# Patient Record
Sex: FEMALE | URBAN - METROPOLITAN AREA
[De-identification: names, ages, dates, MRNs, and addresses within clinical notes are randomized per-mention and may not be internally consistent; named-entity substitution may affect disease eponyms.]

---

## 2019-12-20 ENCOUNTER — OFFICE VISIT (OUTPATIENT)
Dept: OBSTETRICS AND GYNECOLOGY | Facility: CLINIC | Age: 39
End: 2019-12-20
Payer: COMMERCIAL

## 2019-12-20 VITALS — DIASTOLIC BLOOD PRESSURE: 78 MMHG | SYSTOLIC BLOOD PRESSURE: 120 MMHG | WEIGHT: 144.63 LBS

## 2019-12-20 DIAGNOSIS — D50.0 IRON DEFICIENCY ANEMIA DUE TO CHRONIC BLOOD LOSS: ICD-10-CM

## 2019-12-20 DIAGNOSIS — N92.0 MENORRHAGIA WITH REGULAR CYCLE: Primary | ICD-10-CM

## 2019-12-20 PROCEDURE — 99204 PR OFFICE/OUTPT VISIT, NEW, LEVL IV, 45-59 MIN: ICD-10-PCS | Mod: S$GLB,,, | Performed by: OBSTETRICS & GYNECOLOGY

## 2019-12-20 PROCEDURE — 99999 PR PBB SHADOW E&M-NEW PATIENT-LVL II: ICD-10-PCS | Mod: PBBFAC,,, | Performed by: OBSTETRICS & GYNECOLOGY

## 2019-12-20 PROCEDURE — 99204 OFFICE O/P NEW MOD 45 MIN: CPT | Mod: S$GLB,,, | Performed by: OBSTETRICS & GYNECOLOGY

## 2019-12-20 PROCEDURE — 99999 PR PBB SHADOW E&M-NEW PATIENT-LVL II: CPT | Mod: PBBFAC,,, | Performed by: OBSTETRICS & GYNECOLOGY

## 2019-12-20 RX ORDER — NAPROXEN SODIUM 220 MG/1
81 TABLET, FILM COATED ORAL DAILY
COMMUNITY

## 2019-12-20 RX ORDER — TRANEXAMIC ACID 650 MG/1
2 TABLET ORAL 3 TIMES DAILY
Status: ON HOLD | COMMUNITY
Start: 2019-12-16 | End: 2020-01-17

## 2019-12-20 RX ORDER — VITAMIN B COMPLEX
1 CAPSULE ORAL DAILY
COMMUNITY

## 2019-12-20 NOTE — PROGRESS NOTES
Subjective:       Patient ID: Sally Arroyo is a 39 y.o. female.    Chief Complaint:  Follow-up (anemia, menorrhagia )      History of Present Illness  HPI  Referral from Dr Mcguire.   Heavy menses with significant anemia requiring Fe infusion. .  Now with hct of 27   ON Lystedea with last cycle with improvement   Interested in definitive treatment   Reviewed records:  Embx and pap are benign.  Ultrasound with normal ovaries, possible endometrial polyp in otherwise normal uterus     Discussed clinical presentation and offered surgical options.  Recommend hysterectomy     Health Maintenance   Topic Date Due    Lipid Panel  1979    TETANUS VACCINE  1997    Pap Smear with HPV Cotest  2000    Mammogram  2019     GYN & OB History  Patient's last menstrual period was 2019.   Date of Last Pap: No result found    OB History    Para Term  AB Living   4 4           SAB TAB Ectopic Multiple Live Births                  # Outcome Date GA Lbr Marcos/2nd Weight Sex Delivery Anes PTL Lv   4 Para     F CS-Unspec      3 Para     M CS-Unspec      2 Para     F CS-Unspec      1 Para     M CS-Unspec          Review of Systems  Review of Systems        Objective:   Physical Exam     Assessment:        1. Menorrhagia with regular cycle    2. Iron deficiency anemia due to chronic blood loss                Plan:            Sally was seen today for follow-up.    Diagnoses and all orders for this visit:    Menorrhagia with regular cycle  Comments:  Robotic Hysterectomy salpingectomy     Iron deficiency anemia due to chronic blood loss  Comments:  continue with Iron Infusion as planned, Lystedea to control heavy menses until hysterectomy

## 2019-12-23 ENCOUNTER — TELEPHONE (OUTPATIENT)
Dept: OBSTETRICS AND GYNECOLOGY | Facility: CLINIC | Age: 39
End: 2019-12-23

## 2019-12-23 DIAGNOSIS — N92.0 MENORRHAGIA WITH REGULAR CYCLE: Primary | ICD-10-CM

## 2019-12-23 DIAGNOSIS — D50.0 IRON DEFICIENCY ANEMIA DUE TO CHRONIC BLOOD LOSS: ICD-10-CM

## 2020-01-13 ENCOUNTER — TELEPHONE (OUTPATIENT)
Dept: OBSTETRICS AND GYNECOLOGY | Facility: CLINIC | Age: 40
End: 2020-01-13

## 2020-01-13 NOTE — TELEPHONE ENCOUNTER
----- Message from Lindsay Ralph sent at 1/13/2020 10:00 AM CST -----  Contact: PATIENT  Type:  Patient Returning Call    Who Called:PATIENT  Who Left Message for Patient:ADOLFO  Does the patient know what this is regarding?: PRE OP  Would the patient rather a call back or a response via [facility]? CALL  Best Call Back Number:[phone]  Additional Information: PLEASE RETURN CALL ASAP

## 2020-01-15 ENCOUNTER — OFFICE VISIT (OUTPATIENT)
Dept: OBSTETRICS AND GYNECOLOGY | Facility: CLINIC | Age: 40
End: 2020-01-15
Payer: COMMERCIAL

## 2020-01-15 ENCOUNTER — HOSPITAL ENCOUNTER (OUTPATIENT)
Dept: PREADMISSION TESTING | Facility: HOSPITAL | Age: 40
Discharge: HOME | End: 2020-01-15
Attending: OBSTETRICS & GYNECOLOGY
Payer: COMMERCIAL

## 2020-01-15 VITALS — SYSTOLIC BLOOD PRESSURE: 98 MMHG | WEIGHT: 149.25 LBS | DIASTOLIC BLOOD PRESSURE: 78 MMHG

## 2020-01-15 VITALS — HEIGHT: 62 IN | BODY MASS INDEX: 27.05 KG/M2 | WEIGHT: 147 LBS

## 2020-01-15 DIAGNOSIS — N92.0 MENORRHAGIA WITH REGULAR CYCLE: Primary | ICD-10-CM

## 2020-01-15 PROCEDURE — 99499 NO LOS: ICD-10-PCS | Mod: S$GLB,,, | Performed by: OBSTETRICS & GYNECOLOGY

## 2020-01-15 PROCEDURE — 99999 PR PBB SHADOW E&M-EST. PATIENT-LVL II: ICD-10-PCS | Mod: PBBFAC,,,

## 2020-01-15 PROCEDURE — 99499 UNLISTED E&M SERVICE: CPT | Mod: S$GLB,,, | Performed by: OBSTETRICS & GYNECOLOGY

## 2020-01-15 PROCEDURE — 99999 PR PBB SHADOW E&M-EST. PATIENT-LVL II: CPT | Mod: PBBFAC,,,

## 2020-01-15 RX ORDER — ACETAMINOPHEN 500 MG
1000 TABLET ORAL
Status: CANCELLED | OUTPATIENT
Start: 2020-01-15 | End: 2020-01-15

## 2020-01-15 RX ORDER — MUPIROCIN 20 MG/G
OINTMENT TOPICAL
Status: CANCELLED | OUTPATIENT
Start: 2020-01-15

## 2020-01-15 RX ORDER — SODIUM CHLORIDE 9 MG/ML
INJECTION, SOLUTION INTRAVENOUS CONTINUOUS
Status: CANCELLED | OUTPATIENT
Start: 2020-01-15

## 2020-01-15 NOTE — DISCHARGE INSTRUCTIONS
To confirm, Your doctor has instructed you that surgery is scheduled for 01/17/20 at  0700am.       Please report to [facility], [address], 1st floor, main lobby by 0530am.  Pre admit office will call afternoon prior to surgery with final arrival time    INSTRUCTIONS IMPORTANT!!!   Do not eat or smoke after 12 midnight, but may have water/apple juice 3 hours prior to surgery. OK to brush teeth, no gum, candy or mints!    ¨ Take only these medicines with a small swallow of water-morning of surgery.  n/a    Pre operative instructions:  Please review the Pre-Operative Instruction booklet that you were given.        Bathing Instructions--See page 6 in the Pre-operative booklet.      Prevention of surgical site infections:     -Keep incisions clean and dry.   -Do not soak/submerge incisions in water until completely healed.   -Do not apply lotions, powders, creams, or deodorants to site.   -Always make sure hands are cleaned with antibacterial soap/ alcohol-based                 prior to touching the surgical site.  (This includes doctors,                 nurses, staff, and yourself.)    Signs and symptoms:   -Redness and pain around the area where you had surgery   -Drainage of cloudy fluid from your surgical wound   -Fever over 100.4       I have read or had read and explained to me, and understand the above information.  Additional comments or instructions:  Received a copy of Pre-operative instructions booklet, FAQ surgical site infection sheet, and packets of hibiclens (if indicated).

## 2020-01-15 NOTE — H&P (VIEW-ONLY)
History & Physical    SUBJECTIVE:     History of Present Illness:  Patient is a 39 y.o. female who presents for pre-op visit.  She is scheduled for RALH/bilateral salpingectomy on 20 with Dr. Travis Jason.  Patient was sent as referral from Dr. Clay Mcguire because of her heavy menstrual periods and severe anemia. Workup included benign EMB and pap smear.  Patient desires ovarian conservation.        Past Medical History:   Diagnosis Date    Anemia      Past Surgical History:   Procedure Laterality Date     SECTION      x 4      Social History     Socioeconomic History    Marital status: Unknown     Spouse name: Not on file    Number of children: Not on file    Years of education: Not on file    Highest education level: Not on file   Occupational History    Not on file   Social Needs    Financial resource strain: Not on file    Food insecurity:     Worry: Not on file     Inability: Not on file    Transportation needs:     Medical: Not on file     Non-medical: Not on file   Tobacco Use    Smoking status: Never Smoker   Substance and Sexual Activity    Alcohol use: Not Currently    Drug use: Never    Sexual activity: Yes     Partners: Male   Lifestyle    Physical activity:     Days per week: Not on file     Minutes per session: Not on file    Stress: Not on file   Relationships    Social connections:     Talks on phone: Not on file     Gets together: Not on file     Attends Tenriism service: Not on file     Active member of club or organization: Not on file     Attends meetings of clubs or organizations: Not on file     Relationship status: Not on file   Other Topics Concern    Not on file   Social History Narrative    Not on file     Family History   Problem Relation Age of Onset    Diabetes Maternal Grandmother     Hypertension Maternal Grandmother     Stroke Maternal Grandmother     Hypertension Father     Hypertension Mother     Cancer Brother     Diabetes Sister      Breast cancer Paternal Aunt     Cancer Paternal Aunt      Review of patient's allergies indicates:  No Known Allergies  Current Outpatient Medications   Medication Sig Dispense Refill    aspirin 81 MG Chew Take 81 mg by mouth once daily.      b complex vitamins capsule Take 1 capsule by mouth once daily.      ergocalciferol, vitamin D2, (VITAMIN D ORAL) Take 1 Dose by mouth.      tranexamic acid (LYSTEDA) 650 mg tablet Take 2 tablets by mouth 3 (three) times daily.       No current facility-administered medications for this visit.             Review of Systems:  Constitutional: no fever or chills  Respiratory: no cough or shortness of breath  Cardiovascular: no chest pain or palpitations  Gastrointestinal: no nausea or vomiting, tolerating diet  Genitourinary: see HPI  Hematologic/Lymphatic: no easy bruising or lymphadenopathy  Neurological: no seizures or tremors      OBJECTIVE:     Vitals:    01/15/20 1017   BP: 98/78         Physical Exam:  General: well developed, well nourished, no distress  Head: normocephalic  Neck: supple, symmetrical, trachea midline  Lungs:  clear to auscultation bilaterally and normal respiratory effort  Chest Wall: no tenderness  Heart: regular rate and rhythm, S1, S2 normal, no murmur, rub or gallop  Abdomen: soft, non-tender non-distented; bowel sounds normal; no masses,  no organomegaly  Extremities: no cyanosis or edema, or clubbing  Pulses: 2+ and symmetric      Laboratory  Last Hct at outside lab was 27  Preop labs awaiting results:     Diagnostic Results:  EMB benign  Pap smear negative  Imaging- normal appearing ovaries; possible endometrial polyp noted in cavity    ASSESSMENT/PLAN:     The risks, alternatives, and potential complications of robotic assisted hysterectomy w/ bilateral salpingectomy have been explained to the patient and surgical consent was thoroughly reviewed then signed by patient, witnessed by myself, and signed by Dr. GEM Jason.  I also spent time  reviewing the procedure and details about surgery with all concerns addressed.  She and her  had several questions, which were each addressed and answered to the patient's understanding and content. Proceed with RALH/BS Friday 1/17/20 at 7:00.  Preop instructions given, with instructions to shower with antibacterial soap instead of hibiclens.

## 2020-01-15 NOTE — PROGRESS NOTES
History & Physical    SUBJECTIVE:     History of Present Illness:  Patient is a 39 y.o. female who presents for pre-op visit.  She is scheduled for RALH/bilateral salpingectomy on 20 with Dr. Travis Jason.  Patient was sent as referral from Dr. Clay Mcguire because of her heavy menstrual periods and severe anemia. Workup included benign EMB and pap smear.  Patient desires ovarian conservation.        Past Medical History:   Diagnosis Date    Anemia      Past Surgical History:   Procedure Laterality Date     SECTION      x 4      Social History     Socioeconomic History    Marital status: Unknown     Spouse name: Not on file    Number of children: Not on file    Years of education: Not on file    Highest education level: Not on file   Occupational History    Not on file   Social Needs    Financial resource strain: Not on file    Food insecurity:     Worry: Not on file     Inability: Not on file    Transportation needs:     Medical: Not on file     Non-medical: Not on file   Tobacco Use    Smoking status: Never Smoker   Substance and Sexual Activity    Alcohol use: Not Currently    Drug use: Never    Sexual activity: Yes     Partners: Male   Lifestyle    Physical activity:     Days per week: Not on file     Minutes per session: Not on file    Stress: Not on file   Relationships    Social connections:     Talks on phone: Not on file     Gets together: Not on file     Attends Buddhist service: Not on file     Active member of club or organization: Not on file     Attends meetings of clubs or organizations: Not on file     Relationship status: Not on file   Other Topics Concern    Not on file   Social History Narrative    Not on file     Family History   Problem Relation Age of Onset    Diabetes Maternal Grandmother     Hypertension Maternal Grandmother     Stroke Maternal Grandmother     Hypertension Father     Hypertension Mother     Cancer Brother     Diabetes Sister      Breast cancer Paternal Aunt     Cancer Paternal Aunt      Review of patient's allergies indicates:  No Known Allergies  Current Outpatient Medications   Medication Sig Dispense Refill    aspirin 81 MG Chew Take 81 mg by mouth once daily.      b complex vitamins capsule Take 1 capsule by mouth once daily.      ergocalciferol, vitamin D2, (VITAMIN D ORAL) Take 1 Dose by mouth.      tranexamic acid (LYSTEDA) 650 mg tablet Take 2 tablets by mouth 3 (three) times daily.       No current facility-administered medications for this visit.             Review of Systems:  Constitutional: no fever or chills  Respiratory: no cough or shortness of breath  Cardiovascular: no chest pain or palpitations  Gastrointestinal: no nausea or vomiting, tolerating diet  Genitourinary: see HPI  Hematologic/Lymphatic: no easy bruising or lymphadenopathy  Neurological: no seizures or tremors      OBJECTIVE:     Vitals:    01/15/20 1017   BP: 98/78         Physical Exam:  General: well developed, well nourished, no distress  Head: normocephalic  Neck: supple, symmetrical, trachea midline  Lungs:  clear to auscultation bilaterally and normal respiratory effort  Chest Wall: no tenderness  Heart: regular rate and rhythm, S1, S2 normal, no murmur, rub or gallop  Abdomen: soft, non-tender non-distented; bowel sounds normal; no masses,  no organomegaly  Extremities: no cyanosis or edema, or clubbing  Pulses: 2+ and symmetric      Laboratory  Last Hct at outside lab was 27  Preop labs awaiting results:     Diagnostic Results:  EMB benign  Pap smear negative  Imaging- normal appearing ovaries; possible endometrial polyp noted in cavity    ASSESSMENT/PLAN:     The risks, alternatives, and potential complications of robotic assisted hysterectomy w/ bilateral salpingectomy have been explained to the patient and surgical consent was thoroughly reviewed then signed by patient, witnessed by myself, and signed by Dr. GEM Jason.  I also spent time  reviewing the procedure and details about surgery with all concerns addressed.  She and her  had several questions, which were each addressed and answered to the patient's understanding and content. Proceed with RALH/BS Friday 1/17/20 at 7:00.  Preop instructions given, with instructions to shower with antibacterial soap instead of hibiclens.

## 2020-01-16 ENCOUNTER — ANESTHESIA EVENT (OUTPATIENT)
Dept: SURGERY | Facility: HOSPITAL | Age: 40
End: 2020-01-16
Payer: COMMERCIAL

## 2020-01-17 ENCOUNTER — HOSPITAL ENCOUNTER (OUTPATIENT)
Facility: HOSPITAL | Age: 40
Discharge: HOME | End: 2020-01-17
Attending: OBSTETRICS & GYNECOLOGY | Admitting: OBSTETRICS & GYNECOLOGY
Payer: COMMERCIAL

## 2020-01-17 ENCOUNTER — ANESTHESIA (OUTPATIENT)
Dept: SURGERY | Facility: HOSPITAL | Age: 40
End: 2020-01-17
Payer: COMMERCIAL

## 2020-01-17 VITALS
SYSTOLIC BLOOD PRESSURE: 111 MMHG | HEIGHT: 62 IN | DIASTOLIC BLOOD PRESSURE: 66 MMHG | HEART RATE: 109 BPM | WEIGHT: 147 LBS | RESPIRATION RATE: 20 BRPM | BODY MASS INDEX: 27.05 KG/M2 | TEMPERATURE: 97 F | OXYGEN SATURATION: 99 %

## 2020-01-17 DIAGNOSIS — Z90.710 STATUS POST LAPAROSCOPIC HYSTERECTOMY: Primary | ICD-10-CM

## 2020-01-17 DIAGNOSIS — I49.8 SINUS ARRHYTHMIA: ICD-10-CM

## 2020-01-17 DIAGNOSIS — Z90.710 STATUS POST HYSTERECTOMY: ICD-10-CM

## 2020-01-17 DIAGNOSIS — D50.0 IRON DEFICIENCY ANEMIA DUE TO CHRONIC BLOOD LOSS: ICD-10-CM

## 2020-01-17 DIAGNOSIS — N92.0 MENORRHAGIA WITH REGULAR CYCLE: ICD-10-CM

## 2020-01-17 LAB
B-HCG UR QL: NEGATIVE
CTP QC/QA: YES

## 2020-01-17 PROCEDURE — 81025 URINE PREGNANCY TEST: CPT | Performed by: OBSTETRICS & GYNECOLOGY

## 2020-01-17 PROCEDURE — 63600175 PHARM REV CODE 636 W HCPCS: Performed by: ANESTHESIOLOGY

## 2020-01-17 PROCEDURE — 27201423 OPTIME MED/SURG SUP & DEVICES STERILE SUPPLY: Performed by: OBSTETRICS & GYNECOLOGY

## 2020-01-17 PROCEDURE — 58571 TLH W/T/O 250 G OR LESS: CPT | Mod: ,,, | Performed by: OBSTETRICS & GYNECOLOGY

## 2020-01-17 PROCEDURE — 58571 PR LAPAROSCOPY W TOT HYSTERECTUTERUS <=250 GRAM  W TUBE/OVARY: ICD-10-PCS | Mod: ,,, | Performed by: OBSTETRICS & GYNECOLOGY

## 2020-01-17 PROCEDURE — 25000003 PHARM REV CODE 250: Performed by: OBSTETRICS & GYNECOLOGY

## 2020-01-17 PROCEDURE — 99900035 HC TECH TIME PER 15 MIN (STAT)

## 2020-01-17 PROCEDURE — 37000008 HC ANESTHESIA 1ST 15 MINUTES: Performed by: OBSTETRICS & GYNECOLOGY

## 2020-01-17 PROCEDURE — 71000039 HC RECOVERY, EACH ADD'L HOUR: Performed by: OBSTETRICS & GYNECOLOGY

## 2020-01-17 PROCEDURE — 88307 TISSUE EXAM BY PATHOLOGIST: CPT | Performed by: PATHOLOGY

## 2020-01-17 PROCEDURE — 88307 TISSUE EXAM BY PATHOLOGIST: CPT | Mod: 26,,, | Performed by: PATHOLOGY

## 2020-01-17 PROCEDURE — 88307 PR  SURG PATH,LEVEL V: ICD-10-PCS | Mod: 26,,, | Performed by: PATHOLOGY

## 2020-01-17 PROCEDURE — C2628 CATHETER, OCCLUSION: HCPCS | Performed by: OBSTETRICS & GYNECOLOGY

## 2020-01-17 PROCEDURE — 25000003 PHARM REV CODE 250: Performed by: PHYSICIAN ASSISTANT

## 2020-01-17 PROCEDURE — 36000712 HC OR TIME LEV V 1ST 15 MIN: Performed by: OBSTETRICS & GYNECOLOGY

## 2020-01-17 PROCEDURE — 63600175 PHARM REV CODE 636 W HCPCS: Performed by: NURSE ANESTHETIST, CERTIFIED REGISTERED

## 2020-01-17 PROCEDURE — 93005 ELECTROCARDIOGRAM TRACING: CPT

## 2020-01-17 PROCEDURE — 37000009 HC ANESTHESIA EA ADD 15 MINS: Performed by: OBSTETRICS & GYNECOLOGY

## 2020-01-17 PROCEDURE — 93010 EKG 12-LEAD: ICD-10-PCS | Mod: ,,, | Performed by: INTERNAL MEDICINE

## 2020-01-17 PROCEDURE — 25000003 PHARM REV CODE 250: Performed by: NURSE ANESTHETIST, CERTIFIED REGISTERED

## 2020-01-17 PROCEDURE — 36000713 HC OR TIME LEV V EA ADD 15 MIN: Performed by: OBSTETRICS & GYNECOLOGY

## 2020-01-17 PROCEDURE — 25000003 PHARM REV CODE 250: Performed by: ANESTHESIOLOGY

## 2020-01-17 PROCEDURE — 71000033 HC RECOVERY, INTIAL HOUR: Performed by: OBSTETRICS & GYNECOLOGY

## 2020-01-17 PROCEDURE — 93010 ELECTROCARDIOGRAM REPORT: CPT | Mod: ,,, | Performed by: INTERNAL MEDICINE

## 2020-01-17 PROCEDURE — 63600175 PHARM REV CODE 636 W HCPCS: Performed by: PHYSICIAN ASSISTANT

## 2020-01-17 PROCEDURE — 63600175 PHARM REV CODE 636 W HCPCS: Performed by: OBSTETRICS & GYNECOLOGY

## 2020-01-17 PROCEDURE — S0028 INJECTION, FAMOTIDINE, 20 MG: HCPCS | Performed by: ANESTHESIOLOGY

## 2020-01-17 PROCEDURE — S5010 5% DEXTROSE AND 0.45% SALINE: HCPCS | Performed by: OBSTETRICS & GYNECOLOGY

## 2020-01-17 RX ORDER — HYDROMORPHONE HYDROCHLORIDE 2 MG/ML
0.2 INJECTION, SOLUTION INTRAMUSCULAR; INTRAVENOUS; SUBCUTANEOUS EVERY 5 MIN PRN
Status: DISCONTINUED | OUTPATIENT
Start: 2020-01-17 | End: 2020-01-17 | Stop reason: HOSPADM

## 2020-01-17 RX ORDER — GLYCOPYRROLATE 0.2 MG/ML
INJECTION INTRAMUSCULAR; INTRAVENOUS
Status: DISCONTINUED | OUTPATIENT
Start: 2020-01-17 | End: 2020-01-17

## 2020-01-17 RX ORDER — ACETAMINOPHEN 500 MG
1000 TABLET ORAL
Status: COMPLETED | OUTPATIENT
Start: 2020-01-17 | End: 2020-01-17

## 2020-01-17 RX ORDER — HYDROMORPHONE HYDROCHLORIDE 1 MG/ML
1 INJECTION, SOLUTION INTRAMUSCULAR; INTRAVENOUS; SUBCUTANEOUS EVERY 4 HOURS PRN
Status: DISCONTINUED | OUTPATIENT
Start: 2020-01-17 | End: 2020-01-17 | Stop reason: HOSPADM

## 2020-01-17 RX ORDER — CEFAZOLIN SODIUM 2 G/50ML
2 SOLUTION INTRAVENOUS
Status: COMPLETED | OUTPATIENT
Start: 2020-01-17 | End: 2020-01-17

## 2020-01-17 RX ORDER — DEXTROSE MONOHYDRATE AND SODIUM CHLORIDE 5; .45 G/100ML; G/100ML
INJECTION, SOLUTION INTRAVENOUS CONTINUOUS
Status: DISCONTINUED | OUTPATIENT
Start: 2020-01-17 | End: 2020-01-17 | Stop reason: HOSPADM

## 2020-01-17 RX ORDER — DIPHENHYDRAMINE HYDROCHLORIDE 50 MG/ML
25 INJECTION INTRAMUSCULAR; INTRAVENOUS EVERY 6 HOURS PRN
Status: DISCONTINUED | OUTPATIENT
Start: 2020-01-17 | End: 2020-01-17 | Stop reason: HOSPADM

## 2020-01-17 RX ORDER — PROPOFOL 10 MG/ML
VIAL (ML) INTRAVENOUS
Status: DISCONTINUED | OUTPATIENT
Start: 2020-01-17 | End: 2020-01-17

## 2020-01-17 RX ORDER — SODIUM CHLORIDE, SODIUM LACTATE, POTASSIUM CHLORIDE, CALCIUM CHLORIDE 600; 310; 30; 20 MG/100ML; MG/100ML; MG/100ML; MG/100ML
INJECTION, SOLUTION INTRAVENOUS CONTINUOUS PRN
Status: DISCONTINUED | OUTPATIENT
Start: 2020-01-17 | End: 2020-01-17

## 2020-01-17 RX ORDER — SODIUM CHLORIDE 0.9 % (FLUSH) 0.9 %
3 SYRINGE (ML) INJECTION EVERY 8 HOURS
Status: DISCONTINUED | OUTPATIENT
Start: 2020-01-17 | End: 2020-01-17 | Stop reason: HOSPADM

## 2020-01-17 RX ORDER — IBUPROFEN 400 MG/1
800 TABLET ORAL EVERY 8 HOURS
Status: DISCONTINUED | OUTPATIENT
Start: 2020-01-18 | End: 2020-01-17 | Stop reason: HOSPADM

## 2020-01-17 RX ORDER — LIDOCAINE HYDROCHLORIDE 10 MG/ML
INJECTION, SOLUTION EPIDURAL; INFILTRATION; INTRACAUDAL; PERINEURAL
Status: DISCONTINUED | OUTPATIENT
Start: 2020-01-17 | End: 2020-01-17

## 2020-01-17 RX ORDER — MEPERIDINE HYDROCHLORIDE 50 MG/ML
12.5 INJECTION INTRAMUSCULAR; INTRAVENOUS; SUBCUTANEOUS ONCE AS NEEDED
Status: DISCONTINUED | OUTPATIENT
Start: 2020-01-17 | End: 2020-01-17 | Stop reason: HOSPADM

## 2020-01-17 RX ORDER — FENTANYL CITRATE 50 UG/ML
INJECTION, SOLUTION INTRAMUSCULAR; INTRAVENOUS
Status: DISCONTINUED | OUTPATIENT
Start: 2020-01-17 | End: 2020-01-17

## 2020-01-17 RX ORDER — SUCCINYLCHOLINE CHLORIDE 20 MG/ML
INJECTION INTRAMUSCULAR; INTRAVENOUS
Status: DISCONTINUED | OUTPATIENT
Start: 2020-01-17 | End: 2020-01-17

## 2020-01-17 RX ORDER — ONDANSETRON 2 MG/ML
INJECTION INTRAMUSCULAR; INTRAVENOUS
Status: DISCONTINUED | OUTPATIENT
Start: 2020-01-17 | End: 2020-01-17

## 2020-01-17 RX ORDER — METOCLOPRAMIDE HYDROCHLORIDE 5 MG/ML
10 INJECTION INTRAMUSCULAR; INTRAVENOUS EVERY 6 HOURS
Status: DISCONTINUED | OUTPATIENT
Start: 2020-01-17 | End: 2020-01-17

## 2020-01-17 RX ORDER — HYDROCODONE BITARTRATE AND ACETAMINOPHEN 5; 325 MG/1; MG/1
1 TABLET ORAL EVERY 6 HOURS PRN
Qty: 15 TABLET | Refills: 0 | Status: SHIPPED | OUTPATIENT
Start: 2020-01-17 | End: 2020-01-24

## 2020-01-17 RX ORDER — DIPHENHYDRAMINE HCL 25 MG
25 CAPSULE ORAL EVERY 4 HOURS PRN
Status: DISCONTINUED | OUTPATIENT
Start: 2020-01-17 | End: 2020-01-17 | Stop reason: HOSPADM

## 2020-01-17 RX ORDER — METOCLOPRAMIDE HYDROCHLORIDE 5 MG/ML
10 INJECTION INTRAMUSCULAR; INTRAVENOUS EVERY 10 MIN PRN
Status: DISCONTINUED | OUTPATIENT
Start: 2020-01-17 | End: 2020-01-17 | Stop reason: HOSPADM

## 2020-01-17 RX ORDER — KETOROLAC TROMETHAMINE 30 MG/ML
30 INJECTION, SOLUTION INTRAMUSCULAR; INTRAVENOUS EVERY 6 HOURS
Status: DISCONTINUED | OUTPATIENT
Start: 2020-01-17 | End: 2020-01-17 | Stop reason: HOSPADM

## 2020-01-17 RX ORDER — ROCURONIUM BROMIDE 10 MG/ML
INJECTION, SOLUTION INTRAVENOUS
Status: DISCONTINUED | OUTPATIENT
Start: 2020-01-17 | End: 2020-01-17

## 2020-01-17 RX ORDER — HYDROCODONE BITARTRATE AND ACETAMINOPHEN 5; 325 MG/1; MG/1
1 TABLET ORAL EVERY 4 HOURS PRN
Status: DISCONTINUED | OUTPATIENT
Start: 2020-01-17 | End: 2020-01-17 | Stop reason: HOSPADM

## 2020-01-17 RX ORDER — SODIUM CHLORIDE 9 MG/ML
INJECTION, SOLUTION INTRAVENOUS CONTINUOUS
Status: DISCONTINUED | OUTPATIENT
Start: 2020-01-17 | End: 2020-01-17

## 2020-01-17 RX ORDER — DEXAMETHASONE SODIUM PHOSPHATE 4 MG/ML
INJECTION, SOLUTION INTRA-ARTICULAR; INTRALESIONAL; INTRAMUSCULAR; INTRAVENOUS; SOFT TISSUE
Status: DISCONTINUED | OUTPATIENT
Start: 2020-01-17 | End: 2020-01-17

## 2020-01-17 RX ORDER — NEOSTIGMINE METHYLSULFATE 1 MG/ML
INJECTION, SOLUTION INTRAVENOUS
Status: DISCONTINUED | OUTPATIENT
Start: 2020-01-17 | End: 2020-01-17

## 2020-01-17 RX ORDER — ONDANSETRON 8 MG/1
8 TABLET, ORALLY DISINTEGRATING ORAL EVERY 8 HOURS PRN
Status: DISCONTINUED | OUTPATIENT
Start: 2020-01-17 | End: 2020-01-17 | Stop reason: HOSPADM

## 2020-01-17 RX ORDER — FAMOTIDINE 20 MG/50ML
20 INJECTION, SOLUTION INTRAVENOUS 2 TIMES DAILY
Status: DISCONTINUED | OUTPATIENT
Start: 2020-01-17 | End: 2020-01-17

## 2020-01-17 RX ORDER — ONDANSETRON 4 MG/1
4 TABLET, ORALLY DISINTEGRATING ORAL EVERY 8 HOURS PRN
Qty: 14 TABLET | Refills: 0 | Status: SHIPPED | OUTPATIENT
Start: 2020-01-17 | End: 2020-02-26

## 2020-01-17 RX ORDER — MUPIROCIN 20 MG/G
OINTMENT TOPICAL
Status: DISCONTINUED | OUTPATIENT
Start: 2020-01-17 | End: 2020-01-17 | Stop reason: HOSPADM

## 2020-01-17 RX ORDER — PROMETHAZINE HYDROCHLORIDE 25 MG/1
25 TABLET ORAL EVERY 6 HOURS PRN
Status: DISCONTINUED | OUTPATIENT
Start: 2020-01-17 | End: 2020-01-17 | Stop reason: HOSPADM

## 2020-01-17 RX ORDER — MIDAZOLAM HYDROCHLORIDE 1 MG/ML
INJECTION, SOLUTION INTRAMUSCULAR; INTRAVENOUS
Status: DISCONTINUED | OUTPATIENT
Start: 2020-01-17 | End: 2020-01-17

## 2020-01-17 RX ADMIN — FAMOTIDINE 20 MG: 20 INJECTION, SOLUTION INTRAVENOUS at 07:01

## 2020-01-17 RX ADMIN — FENTANYL CITRATE 50 MCG: 50 INJECTION, SOLUTION INTRAMUSCULAR; INTRAVENOUS at 07:01

## 2020-01-17 RX ADMIN — KETOROLAC TROMETHAMINE 30 MG: 30 INJECTION, SOLUTION INTRAMUSCULAR; INTRAVENOUS at 11:01

## 2020-01-17 RX ADMIN — PROMETHAZINE HYDROCHLORIDE 25 MG: 25 TABLET ORAL at 11:01

## 2020-01-17 RX ADMIN — HYDROMORPHONE HYDROCHLORIDE 0.2 MG: 2 INJECTION INTRAMUSCULAR; INTRAVENOUS; SUBCUTANEOUS at 10:01

## 2020-01-17 RX ADMIN — SUCCINYLCHOLINE CHLORIDE 100 MG: 20 INJECTION, SOLUTION INTRAMUSCULAR; INTRAVENOUS at 07:01

## 2020-01-17 RX ADMIN — ACETAMINOPHEN 1000 MG: 500 TABLET ORAL at 04:01

## 2020-01-17 RX ADMIN — HYDROMORPHONE HYDROCHLORIDE 0.2 MG: 2 INJECTION INTRAMUSCULAR; INTRAVENOUS; SUBCUTANEOUS at 09:01

## 2020-01-17 RX ADMIN — ACETAMINOPHEN 1000 MG: 500 TABLET ORAL at 06:01

## 2020-01-17 RX ADMIN — ONDANSETRON 4 MG: 2 INJECTION, SOLUTION INTRAMUSCULAR; INTRAVENOUS at 07:01

## 2020-01-17 RX ADMIN — ROBINUL 0.6 MG: 0.2 INJECTION INTRAMUSCULAR; INTRAVENOUS at 08:01

## 2020-01-17 RX ADMIN — SODIUM CHLORIDE, SODIUM LACTATE, POTASSIUM CHLORIDE, AND CALCIUM CHLORIDE: 600; 310; 30; 20 INJECTION, SOLUTION INTRAVENOUS at 07:01

## 2020-01-17 RX ADMIN — PROPOFOL 160 MG: 10 INJECTION, EMULSION INTRAVENOUS at 07:01

## 2020-01-17 RX ADMIN — ROCURONIUM BROMIDE 35 MG: 10 INJECTION, SOLUTION INTRAVENOUS at 07:01

## 2020-01-17 RX ADMIN — NEOSTIGMINE METHYLSULFATE 4 MG: 1 INJECTION INTRAVENOUS at 08:01

## 2020-01-17 RX ADMIN — FENTANYL CITRATE 50 MCG: 50 INJECTION, SOLUTION INTRAMUSCULAR; INTRAVENOUS at 09:01

## 2020-01-17 RX ADMIN — MIDAZOLAM 2 MG: 1 INJECTION INTRAMUSCULAR; INTRAVENOUS at 07:01

## 2020-01-17 RX ADMIN — DEXAMETHASONE SODIUM PHOSPHATE 8 MG: 4 INJECTION, SOLUTION INTRA-ARTICULAR; INTRALESIONAL; INTRAMUSCULAR; INTRAVENOUS; SOFT TISSUE at 07:01

## 2020-01-17 RX ADMIN — ONDANSETRON 8 MG: 8 TABLET, ORALLY DISINTEGRATING ORAL at 02:01

## 2020-01-17 RX ADMIN — CEFAZOLIN SODIUM 2 G: 2 SOLUTION INTRAVENOUS at 07:01

## 2020-01-17 RX ADMIN — METOCLOPRAMIDE 10 MG: 5 INJECTION, SOLUTION INTRAMUSCULAR; INTRAVENOUS at 07:01

## 2020-01-17 RX ADMIN — Medication 20 MG: at 07:01

## 2020-01-17 RX ADMIN — LIDOCAINE HYDROCHLORIDE 50 MG: 10 INJECTION, SOLUTION EPIDURAL; INFILTRATION; INTRACAUDAL; PERINEURAL at 07:01

## 2020-01-17 RX ADMIN — ROCURONIUM BROMIDE 5 MG: 10 INJECTION, SOLUTION INTRAVENOUS at 07:01

## 2020-01-17 RX ADMIN — DEXTROSE AND SODIUM CHLORIDE: 5; .45 INJECTION, SOLUTION INTRAVENOUS at 11:01

## 2020-01-17 NOTE — NURSING
Preparing to discharge pt, attempting to void one more time, gave discharge paperwork for pt to review, no follow up appts to make, zofran and norco already with pt's mahad at bs

## 2020-01-17 NOTE — ASSESSMENT & PLAN NOTE
POD #0 s/p RALH bilateral salpingectomy  Pt doing well, afebrile overnight.  Proceed with routine postoperative care, encouraged ambulation, aware ok to shower.  Pt counseled on management plan and discharge goals. Pending successful void trial and continued resolution of nausea/vomiting will plan to discharge home. Will send zofran in to pharmacy. Patient in agreement.

## 2020-01-17 NOTE — OP NOTE
[facility]  Surgery Department  Operative Note    SUMMARY     Date of Procedure: 1/17/2020     Procedure: Procedure(s) (LRB):  XI ROBOTIC HYSTERECTOMY (N/A)     Surgeon(s) and Role:     * PRO Jason MD - Primary    Assisting Surgeon: Magali WATERMAN     Pre-Operative Diagnosis: Menorrhagia with regular cycle [N92.0]  Iron deficiency anemia due to chronic blood loss [D50.0]    Post-Operative Diagnosis: Post-Op Diagnosis Codes:     * Menorrhagia with regular cycle [N92.0]     * Iron deficiency anemia due to chronic blood loss [D50.0]    Anesthesia: General    Technical Procedures Used: [employer] Robotic System     Description of the Findings of the Procedure:     Procedure in Detail/Findings:  Operative findings    Upper abdomen normal liver , no adhesions, no bowel abnormalities    Pelvis:  Uterus normal size and shape,  Ovaries and tubes normal ,                  No Adhesions, No endometriosis                 Ureters in there normal retroperitoneal position through the pelvis                 Bladder Flap with dense adhesions across the anterior cervix   Installation of sterile mild after removal of the uterus shows no rent in the bladder         The patient was taken to the surgical suite.  General              intubation  anesthesia is induced.                                                                   The patient was placed in supine lithotomy  position  with low Oneil stirrups                                                                   The vagina was prepped with Betadine.  A Ott     catheter, BENJY manipulator with  KOH colpotomizer placed.    Abdomen prepped   with  chlorhexidine solution                                                                Sterile drapes applied     Time-out taken with all members of the operating team.       Veress  needle placed through the umbilicus, abdomen elevated with towel clamps.     CO2 gas insufflation to 15 mmHg.          Trochar  placement as follows:       8 mm bladeless trochars:      4 trochars, transverse line 2 cm above the umbilicus, 8 cm apart with direct visualization after the initial placement                     The [employer] robotic XI   system docked from the right side of the patient.     .    Instrument placement as follows   Camera:  Right midline port   Unipolar Scissors:  Right lateral port   [place] Bipolar Forceps:  Left midline port   Fenestrated Forceps:  Left lateral port    The surgeon moved to the robotic console and the first assistant remained at the bedside for uterine manipulation and utilization of instruments placed through the left lateral trochar                                                                              Prior to starting the hysterectomy, the anatomical landmarks of the pelvis and the pelvic course of the ureters right and left are identified.   The [place] bipolar is used for cautery and the scissors used for transection of the pedicles .  The  round ligaments are  Transected after cautery on each side and dissection of the broad ligament and development of the bladder flap is accomplished  with the unipolar scissors   The mesosalpinx of the right and left fallopian tubes are cauterized with the [place] forceps and incised with scissors to the level of the uterus     The anterior and posterior vagina entered over the colpotomizer with unipolar scissors and the colpotomy is extended over the colpotomizer anterior and posterior to the ascending uterine bilateral uterine arteries.    The Uterine vessels are then cauterized with the [place] instrument and transected.     With the uterus free of blood supply and vaginal connection, it is removed with the cervix and bilateral tubes through the vagina.    Bleeding of the uterine pedicle or vaginal cuff is cauterized.   Closure of the vaginal cuff done with Zero Vicryl suture interrupted figure of 8 starting at each angle        A 1 cm area of  thinning high on the bladder flap is oversewn with 2 zero Vicryl suture running        All needles used in the abdomen removed through the vagina or  the 8 mm trocar on the left are  accounted for.       Good hemostasis was  Appreciated in the operative field, any bleeding noted cauterized with bipolar instrument     Any excess fluid was suctioned free.    After the instruments were removed, the da Anisha robotic system was undocked        All trochar  sites closed with 4-0 Monocryl subcuticular and Dermabond.       The specimen removed from the vagina.  Vagina examined for any tears.       The  patient taken to Recovery in stable condition with lucas catheter in place              Significant Surgical Tasks Conducted by the Assistant(s), if Applicable: Bedside assistance     Complications: No    Estimated Blood Loss (EBL): 40 mL           Implants: * No implants in log *    Specimens:      Uterus, cervix, Bilateral fallopian tubes            Condition: Good    Disposition: PACU - hemodynamically stable.    Attestation: I was present and scrubbed for the entire procedure.

## 2020-01-17 NOTE — H&P
[facility]  Obstetrics & Gynecology  Pre op note     Patient Name: Sally Arroyo  MRN: [id number]  Admission Date: 1/17/2020  Primary Care Provider: Ryne Wyatt MD    Subjective:     Chief Complaint/Reason for Admission: Heavy menses     History of Present Illness:  Referral from Dr Mcguire.   Heavy menses with significant anemia requiring Fe infusion. .  Now with hct of 27   ON Lystedea with last cycle with improvement   Interested in definitive treatment   Reviewed records:  Embx and pap are benign.  Ultrasound with normal ovaries, possible endometrial polyp in otherwise normal uterus     No new subjective & objective note has been filed under this hospital service since the last note was generated.    Assessment/Plan:     * Menorrhagia with regular cycle  Robotic Hysterectomy bilateral salpingectomy     Iron deficiency anemia due to chronic blood loss  Hysterectomy         F Travis Jason MD  Obstetrics & Gynecology  [facility]

## 2020-01-17 NOTE — DISCHARGE SUMMARY
[facility] -   Obstetrics & Gynecology  Discharge Summary    Patient Name: Sally Arroyo  MRN: [id number]  Admission Date: 1/17/2020  Hospital Length of Stay: 0 days  Discharge Date and Time:  01/17/2020 3:26 PM  Attending Physician: PRO Jason MD   Discharging Provider: Blessing Wright PA-C  Primary Care Provider: Ryne Wyatt MD    HPI:  Referral from Dr Mcguire.   Heavy menses with significant anemia requiring Fe infusion. .  Now with hct of 27   ON Lystedea with last cycle with improvement   Interested in definitive treatment   Reviewed records:  Embx and pap are benign.  Ultrasound with normal ovaries, possible endometrial polyp in otherwise normal uterus     Hospital Course:  Patient presented for robotic assisted laparoscopic hysterectomy bilateral salpingectomy for menorrhagia. Patient progressed well postoperatively without complication, pain well controlled with po medications, and resolution of post op nausea and vomiting tolerating po liquids. Reviewed discharge instructions including restricting heavy lifting, pelvic rest. Stable for discharge post op day 0 pending successful void trial.       Procedure(s) (LRB):  XI ROBOTIC HYSTERECTOMY (N/A)         Significant Diagnostic Studies: Labs: All labs within the past 24 hours have been reviewed    Pending Diagnostic Studies:     Procedure Component Value Units Date/Time    Specimen to Pathology, Surgery Gynecology and Obstetrics [[id number]] Collected:  01/17/20 0842    Order Status:  Sent Lab Status:  In process Updated:  01/17/20 1219        Final Active Diagnoses:    Diagnosis Date Noted POA    PRINCIPAL PROBLEM:  Menorrhagia with regular cycle [N92.0] 12/20/2019 Yes    Status post hysterectomy [Z90.710] 01/17/2020 Unknown      Problems Resolved During this Admission:    Diagnosis Date Noted Date Resolved POA    Iron deficiency anemia due to chronic blood loss [D50.0] 12/20/2019 01/17/2020 Yes        Discharged Condition:  good    Disposition: Home or Self Care    Follow Up:  Follow-up Information     F Travis Jason MD. Go on 2/28/2020.    Specialties:  Obstetrics, Obstetrics and Gynecology  Why:  Post op  Contact information:  [address]  [place] [id number] [phone]                 Patient Instructions:      Diet Adult Regular     Lifting restrictions   Order Comments: Limit lifting to 15 lb     Other restrictions (specify):   Order Comments: Pelvic rest: nothing in the vagina, including douching, tampons, or intercourse for 12 weeks  Shower only, no baths until cleared by surgeon     No driving until:   Order Comments: No driving until off of all opiate pain meds and able to brake quickly without pain.     Notify your health care provider if you experience any of the following:  persistent nausea and vomiting or diarrhea     Notify your health care provider if you experience any of the following:  severe uncontrolled pain     Notify your health care provider if you experience any of the following:  redness, tenderness, or signs of infection (pain, swelling, redness, odor or green/yellow discharge around incision site)     Notify your health care provider if you experience any of the following:  difficulty breathing or increased cough     Notify your health care provider if you experience any of the following:  increased confusion or weakness     Notify your health care provider if you experience any of the following:   Order Comments: Heavy vaginal bleeding.     Medications:  Reconciled Home Medications:      Medication List      START taking these medications    HYDROcodone-acetaminophen 5-325 mg per tablet  Commonly known as:  NORCO  Take 1 tablet by mouth every 6 (six) hours as needed.     ondansetron 4 MG Tbdl  Commonly known as:  ZOFRAN-ODT  Take 1 tablet (4 mg total) by mouth every 8 (eight) hours as needed (nausea).        CONTINUE taking these medications    aspirin 81 MG Chew  Take 81 mg by mouth once  daily.     b complex vitamins capsule  Take 1 capsule by mouth once daily.     VITAMIN D ORAL  Take 1 Dose by mouth.        STOP taking these medications    tranexamic acid 650 mg tablet  Commonly known as:  JASSTEDA            Blessing Wright PA-C  Obstetrics & Gynecology  [facility]

## 2020-01-17 NOTE — SUBJECTIVE & OBJECTIVE
Interval History: Patient reports she is doing well. She is ambulating well without dizziness or shortness of breath but did develop nausea and vomiting. Since then improvement after antiemetics and has tolerated po. Has yet to void. No fever. Pain is very well controlled. No vaginal bleeding.      Scheduled Meds:   acetaminophen  1,000 mg Oral Once Pre-Op    ketorolac  30 mg Intravenous Q6H    Followed by    [START ON 1/18/2020] ibuprofen  800 mg Oral Q8H    nozaseptin   Each Nostril BID     Continuous Infusions:   dextrose 5 % and 0.45 % NaCl 125 mL/hr at 01/17/20 1101     PRN Meds:diphenhydrAMINE, HYDROcodone-acetaminophen, HYDROmorphone, ondansetron, promethazine    Review of patient's allergies indicates:   Allergen Reactions    Adhesive Rash    Hibiclens [chlorhexidine gluconate] Swelling and Other (See Comments)     fever       Objective:     Vital Signs (Most Recent):  Temp: 98 °F (36.7 °C) (01/17/20 1101)  Pulse: 91 (01/17/20 1101)  Resp: 15 (01/17/20 1101)  BP: 108/60 (01/17/20 1101)  SpO2: 95 % (01/17/20 1101) Vital Signs (24h Range):  Temp:  [97.1 °F (36.2 °C)-98.6 °F (37 °C)] 98 °F (36.7 °C)  Pulse:  [] 91  Resp:  [11-20] 15  SpO2:  [95 %-100 %] 95 %  BP: ()/(50-70) 108/60     Weight: 66.7 kg (147 lb)  Body mass index is 26.89 kg/m².  Patient's last menstrual period was 01/02/2020 (approximate).    I&O (Last 24H):    Intake/Output Summary (Last 24 hours) at 1/17/2020 1522  Last data filed at 1/17/2020 0916  Gross per 24 hour   Intake 1400 ml   Output 640 ml   Net 760 ml       Physical Exam:   Constitutional: She is oriented to person, place, and time. She appears well-developed and well-nourished. No distress.       Cardiovascular: Normal rate, regular rhythm and normal heart sounds.    No murmur heard.   Pulmonary/Chest: Effort normal and breath sounds normal. No respiratory distress. She has no wheezes. She has no rales.        Abdominal: Soft. Bowel sounds are normal. She exhibits  abdominal incision (clean, dry, intact with steri strips in place). She exhibits no distension. There is no tenderness. There is no guarding.     Genitourinary: Vagina normal.           Musculoskeletal: She exhibits no edema.   No calf tenderness       Neurological: She is alert and oriented to person, place, and time.    Skin: Skin is warm and dry. No rash noted. She is not diaphoretic.        Laboratory:  I have personallly reviewed all pertinent lab results from the last 24 hours.    Diagnostic Results:  Labs: Reviewed

## 2020-01-17 NOTE — HPI
Referral from Dr Mcguire.   Heavy menses with significant anemia requiring Fe infusion. .  Now with hct of 27   ON Lystedea with last cycle with improvement   Interested in definitive treatment   Reviewed records:  Embx and pap are benign.  Ultrasound with normal ovaries, possible endometrial polyp in otherwise normal uterus

## 2020-01-17 NOTE — ANESTHESIA RELEASE NOTE
"Anesthesia Release from PACU Note    Patient: Sally Arroyo    Procedure(s) Performed: Procedure(s) (LRB):  XI ROBOTIC HYSTERECTOMY (N/A)    Anesthesia type: general    Post pain: Adequate analgesia    Post assessment: no apparent anesthetic complications, tolerated procedure well and no evidence of recall    Last Vitals:   Visit Vitals  BP (!) 105/55   Pulse 93   Temp 36.2 °C (97.1 °F) (Temporal)   Resp 19   Ht 5' 2" (1.575 m)   Wt 66.7 kg (147 lb)   LMP 01/02/2020 (Approximate)   SpO2 100%   Breastfeeding? No   BMI 26.89 kg/m²       Post vital signs: stable    Level of consciousness: responds to stimulation    Nausea/Vomiting: no nausea/no vomiting    Complications: none    Airway Patency: patent    Respiratory: unassisted    Cardiovascular: stable and blood pressure at baseline    Hydration: euvolemic     "

## 2020-01-17 NOTE — TRANSFER OF CARE
"Anesthesia Transfer of Care Note    Patient: Sally Arroyo    Procedure(s) Performed: Procedure(s) (LRB):  XI ROBOTIC HYSTERECTOMY (N/A)    Patient location: PACU    Anesthesia Type: general    Transport from OR: Transported from OR on room air with adequate spontaneous ventilation    Post pain: adequate analgesia    Post assessment: no apparent anesthetic complications    Post vital signs: stable    Level of consciousness: responds to stimulation    Nausea/Vomiting: no nausea/vomiting    Complications: none    Transfer of care protocol was followed      Last vitals:   Visit Vitals  BP (!) 105/55   Pulse 93   Temp 36.2 °C (97.1 °F) (Temporal)   Resp 19   Ht 5' 2" (1.575 m)   Wt 66.7 kg (147 lb)   LMP 01/02/2020 (Approximate)   SpO2 100%   Breastfeeding? No   BMI 26.89 kg/m²     "

## 2020-01-17 NOTE — HOSPITAL COURSE
Patient presented for robotic assisted laparoscopic hysterectomy bilateral salpingectomy for menorrhagia. Patient progressed well postoperatively without complication, pain well controlled with po medications, and resolution of post op nausea and vomiting tolerating po liquids. Reviewed discharge instructions including restricting heavy lifting, pelvic rest. Stable for discharge post op day 0 pending successful void trial.

## 2020-01-17 NOTE — INTERVAL H&P NOTE
The patient has been examined and the H&P has been reviewed:    I concur with the findings and no changes have occurred since H&P was written.    Anesthesia/Surgery risks, benefits and alternative options discussed and understood by patient/family.      I have explained the risks, benefits , and alternatives of laparoscopic hysterectomy in detail.  The patient voices understanding and all questions have been answered.  The patient agrees to proceed as planned.  Consent forms for the procedure have been reviewed and signed by the patient.       Active Hospital Problems    Diagnosis  POA    Menorrhagia with regular cycle [N92.0]  Yes      Resolved Hospital Problems   No resolved problems to display.

## 2020-01-17 NOTE — ANESTHESIA PREPROCEDURE EVALUATION
01/17/2020  Sally Arroyo is a 39 y.o., female.    Pre-op Assessment    I have reviewed the Patient Summary Reports.     I have reviewed the Medications.     Review of Systems  Anesthesia Hx:  No problems with previous Anesthesia   Denies Personal Hx of Anesthesia complications.   Social:  Non-Smoker    Hematology/Oncology:  Hematology Normal   Oncology Normal     EENT/Dental:EENT/Dental Normal   Cardiovascular:  Cardiovascular Normal Exercise tolerance: good     Pulmonary:  Pulmonary Normal    Renal/:  Renal/ Normal     Hepatic/GI:  Hepatic/GI Normal    Musculoskeletal:  Musculoskeletal Normal    Neurological:   CVA    Endocrine:  Endocrine Normal    Dermatological:  Skin Normal    Psych:  Psychiatric Normal           Physical Exam  General:  Well nourished    Airway/Jaw/Neck:  Airway Findings: Mouth Opening: Normal Tongue: Normal  Mallampati: I      Dental:  Dental Findings: In tact   Chest/Lungs:  Chest/Lungs Clear    Heart/Vascular:  Heart Findings: Normal Heart murmur: negative       Mental Status:  Mental Status Findings:  Cooperative, Alert and Oriented         Anesthesia Plan  Type of Anesthesia, risks & benefits discussed:  Anesthesia Type:  general  Patient's Preference:   Intra-op Monitoring Plan: standard ASA monitors  Intra-op Monitoring Plan Comments:   Post Op Pain Control Plan: multimodal analgesia  Post Op Pain Control Plan Comments:   Induction:   IV  Beta Blocker:  Patient is not currently on a Beta-Blocker (No further documentation required).       Informed Consent: Patient understands risks and agrees with Anesthesia plan.  Questions answered. Anesthesia consent signed with patient.  ASA Score: 2     Day of Surgery Review of History & Physical: I have interviewed and examined the patient. I have reviewed the patient's H&P dated:    H&P update referred to the surgeon.      Anesthesia Plan Notes: Pepcid 20  Reglan 10  Decadron 8  Zofran 4

## 2020-01-17 NOTE — PROGRESS NOTES
[facility] -   Obstetrics & Gynecology  Progress Note    Patient Name: Sally Arroyo  MRN: [id number]  Admission Date: 1/17/2020  Primary Care Provider: Ryne Wyatt MD  Principal Problem: Menorrhagia with regular cycle    Subjective:     HPI:  Referral from Dr Mcguire.   Heavy menses with significant anemia requiring Fe infusion. .  Now with hct of 27   ON Lystedea with last cycle with improvement   Interested in definitive treatment   Reviewed records:  Embx and pap are benign.  Ultrasound with normal ovaries, possible endometrial polyp in otherwise normal uterus     Interval History: Patient reports she is doing well. She is ambulating well without dizziness or shortness of breath but did develop nausea and vomiting. Since then improvement after antiemetics and has tolerated po. Has yet to void. No fever. Pain is very well controlled. No vaginal bleeding.      Scheduled Meds:   acetaminophen  1,000 mg Oral Once Pre-Op    ketorolac  30 mg Intravenous Q6H    Followed by    [START ON 1/18/2020] ibuprofen  800 mg Oral Q8H    nozaseptin   Each Nostril BID     Continuous Infusions:   dextrose 5 % and 0.45 % NaCl 125 mL/hr at 01/17/20 1101     PRN Meds:diphenhydrAMINE, HYDROcodone-acetaminophen, HYDROmorphone, ondansetron, promethazine    Review of patient's allergies indicates:   Allergen Reactions    Adhesive Rash    Hibiclens [chlorhexidine gluconate] Swelling and Other (See Comments)     fever       Objective:     Vital Signs (Most Recent):  Temp: 98 °F (36.7 °C) (01/17/20 1101)  Pulse: 91 (01/17/20 1101)  Resp: 15 (01/17/20 1101)  BP: 108/60 (01/17/20 1101)  SpO2: 95 % (01/17/20 1101) Vital Signs (24h Range):  Temp:  [97.1 °F (36.2 °C)-98.6 °F (37 °C)] 98 °F (36.7 °C)  Pulse:  [] 91  Resp:  [11-20] 15  SpO2:  [95 %-100 %] 95 %  BP: ()/(50-70) 108/60     Weight: 66.7 kg (147 lb)  Body mass index is 26.89 kg/m².  Patient's last menstrual period was 01/02/2020  (approximate).    I&O (Last 24H):    Intake/Output Summary (Last 24 hours) at 1/17/2020 1522  Last data filed at 1/17/2020 0916  Gross per 24 hour   Intake 1400 ml   Output 640 ml   Net 760 ml       Physical Exam:   Constitutional: She is oriented to person, place, and time. She appears well-developed and well-nourished. No distress.       Cardiovascular: Normal rate, regular rhythm and normal heart sounds.    No murmur heard.   Pulmonary/Chest: Effort normal and breath sounds normal. No respiratory distress. She has no wheezes. She has no rales.        Abdominal: Soft. Bowel sounds are normal. She exhibits abdominal incision (clean, dry, intact with steri strips in place). She exhibits no distension. There is no tenderness. There is no guarding.     Genitourinary: Vagina normal.           Musculoskeletal: She exhibits no edema.   No calf tenderness       Neurological: She is alert and oriented to person, place, and time.    Skin: Skin is warm and dry. No rash noted. She is not diaphoretic.        Laboratory:  I have personallly reviewed all pertinent lab results from the last 24 hours.    Diagnostic Results:  Labs: Reviewed    Assessment/Plan:     * Menorrhagia with regular cycle  S/p Robotic Hysterectomy bilateral salpingectomy     Status post hysterectomy  POD #0 s/p RALH bilateral salpingectomy  Pt doing well, afebrile overnight.  Proceed with routine postoperative care, encouraged ambulation, aware ok to shower.  Pt counseled on management plan and discharge goals. Pending successful void trial and continued resolution of nausea/vomiting will plan to discharge home. Will send zofran in to pharmacy. Patient in agreement.               Blessing Wright PA-C  Obstetrics & Gynecology  [facility] [place]

## 2020-01-18 NOTE — ANESTHESIA POSTPROCEDURE EVALUATION
Anesthesia Post Evaluation    Patient: Sally Arroyo    Procedure(s) Performed: Procedure(s) (LRB):  XI ROBOTIC HYSTERECTOMY (N/A)    Final Anesthesia Type: general    Patient location during evaluation: PACU  Patient participation: Yes- Able to Participate  Level of consciousness: awake and alert, oriented and awake  Post-procedure vital signs: reviewed and stable  Pain management: adequate  Airway patency: patent    PONV status at discharge: No PONV  Anesthetic complications: no      Cardiovascular status: blood pressure returned to baseline  Respiratory status: unassisted and spontaneous ventilation  Hydration status: euvolemic  Follow-up not needed.          Vitals Value Taken Time   /66 1/17/2020  5:19 PM   Temp 35.9 °C (96.7 °F) 1/17/2020  5:19 PM   Pulse 109 1/17/2020  5:19 PM   Resp 20 1/17/2020  5:19 PM   SpO2 99 % 1/17/2020  5:19 PM         Event Time     Out of Recovery 10:36:05          Pain/Ramesh Score: Pain Rating Prior to Med Admin: 4 (1/17/2020  4:30 PM)  Ramesh Score: 10 (1/17/2020 10:30 AM)

## 2020-01-21 LAB
FINAL PATHOLOGIC DIAGNOSIS: NORMAL
GROSS: NORMAL

## 2020-01-28 ENCOUNTER — TELEPHONE (OUTPATIENT)
Dept: OBSTETRICS AND GYNECOLOGY | Facility: CLINIC | Age: 40
End: 2020-01-28

## 2020-01-28 NOTE — TELEPHONE ENCOUNTER
----- Message from Susanne Del Castillo sent at 1/28/2020  8:33 AM CST -----  Contact: Pt/Spouse  Pt spouse is calling to ask the nurse if he can take the pt for a drive on Friday she makes 2 weeks. Please give him a call at [phone]

## 2020-01-28 NOTE — TELEPHONE ENCOUNTER
Spoke with patient , patient is doing well, no complaints, having some cabin fever and wants to get out.  Advised is fine for drive as long as patient feels up to it.

## 2020-02-14 ENCOUNTER — TELEPHONE (OUTPATIENT)
Dept: OBSTETRICS AND GYNECOLOGY | Facility: CLINIC | Age: 40
End: 2020-02-14

## 2020-02-14 NOTE — TELEPHONE ENCOUNTER
Pt would like for her [facility] paper work to be faxed to [phone].    I do not see it in her chart.     Please advise. Thank you.

## 2020-02-14 NOTE — TELEPHONE ENCOUNTER
----- Message from Michelle No sent at 2/14/2020  3:13 PM CST -----  Type:  Needs Medical Advice    Who Called:  Pt  Sally  Symptoms (please be specific):   Calling regarding paperwork   How long has patient had these symptoms:     Pharmacy name and phone #:     Would the patient rather a call back or a response via [facility]?    Call back  Best Call Back Number:   [phone]  Additional Information:  Pt states she had a hysterectomy//her  dropped off paperwork over a week ago that needed to be filled out for her employer//she is calling to check on the status///they have not heard anything//please call//ziggy/rosette

## 2020-02-26 ENCOUNTER — OFFICE VISIT (OUTPATIENT)
Dept: OBSTETRICS AND GYNECOLOGY | Facility: CLINIC | Age: 40
End: 2020-02-26
Payer: COMMERCIAL

## 2020-02-26 VITALS
SYSTOLIC BLOOD PRESSURE: 106 MMHG | BODY MASS INDEX: 28.16 KG/M2 | HEIGHT: 62 IN | WEIGHT: 153 LBS | DIASTOLIC BLOOD PRESSURE: 68 MMHG

## 2020-02-26 DIAGNOSIS — Z90.710 STATUS POST HYSTERECTOMY: Primary | ICD-10-CM

## 2020-02-26 PROBLEM — N92.0 MENORRHAGIA WITH REGULAR CYCLE: Status: RESOLVED | Noted: 2019-12-20 | Resolved: 2020-02-26

## 2020-02-26 PROCEDURE — 99024 PR POST-OP FOLLOW-UP VISIT: ICD-10-PCS | Mod: S$GLB,,, | Performed by: OBSTETRICS & GYNECOLOGY

## 2020-02-26 PROCEDURE — 99999 PR PBB SHADOW E&M-EST. PATIENT-LVL III: CPT | Mod: PBBFAC,,, | Performed by: OBSTETRICS & GYNECOLOGY

## 2020-02-26 PROCEDURE — 99024 POSTOP FOLLOW-UP VISIT: CPT | Mod: S$GLB,,, | Performed by: OBSTETRICS & GYNECOLOGY

## 2020-02-26 PROCEDURE — 99999 PR PBB SHADOW E&M-EST. PATIENT-LVL III: ICD-10-PCS | Mod: PBBFAC,,, | Performed by: OBSTETRICS & GYNECOLOGY

## 2020-02-26 NOTE — PROGRESS NOTES
"Subjective:      Sally Arroyo is a 40 y.o. female who presents to the clinic 4 weeks status post Davinci assisted hysterectomy and bilateral salpingectomy for abnormal uterine bleeding. Eating a regular diet without difficulty. Bowel movements are normal. The patient is not having any pain.    The following portions of the patient's history were reviewed and updated as appropriate: allergies, current medications, past family history, past medical history, past social history, past surgical history and problem list.    Review of Systems  Pertinent items are noted in HPI.      Objective:     /68   Ht 5' 2" (1.575 m)   Wt 69.4 kg (153 lb)   LMP 01/02/2020   BMI 27.98 kg/m²   General:  alert, appears stated age and cooperative   Abdomen: soft, bowel sounds active, non-tender   Incision:   healing well, no drainage, no erythema, no hernia, no seroma, no swelling, no dehiscence, incision well approximated     Assessment:      Doing well postoperatively.  Operative findings again reviewed. Pathology report discussed.      Plan:      1. Continue any current medications.  2. Wound care discussed.  3. Activity restrictions: Pelvic Rest for 8 weeks total   4. Anticipated return to work: now.  5. Follow up: 1 year     Pap not indicated for screening into the future  "

## (undated) DEVICE — TROCAR ENDOPATH XCEL 5X100MM

## (undated) DEVICE — Device

## (undated) DEVICE — SYR 50CC LL

## (undated) DEVICE — SYR 10CC LUER LOCK

## (undated) DEVICE — SUT VICRYL PLUS 0 CT1 36IN

## (undated) DEVICE — COVER OVERHEAD SURG LT BLUE

## (undated) DEVICE — SEE MEDLINE ITEM 157181

## (undated) DEVICE — SUPPORT ULNA NERVE PROTECTOR

## (undated) DEVICE — SUT VICRYL CTD 2-0 GI 27 SH

## (undated) DEVICE — SOL 9P NACL IRR PIC IL

## (undated) DEVICE — DRAPE ARM DAVINCI XI

## (undated) DEVICE — COVER TIP CURVED SCISSORS XI

## (undated) DEVICE — CLOSURE SKIN STERI STRIP 1/2X4

## (undated) DEVICE — SEE MEDLINE ITEM 146292

## (undated) DEVICE — OCCLUDER COLPO-PNEUMO STERILE

## (undated) DEVICE — MANIPULATOR TIP RUMI ORANGE

## (undated) DEVICE — SEE MEDLINE ITEM 152739

## (undated) DEVICE — SEE MEDLINE ITEM 154981

## (undated) DEVICE — GLOVE SURGICAL LATEX SZ 7

## (undated) DEVICE — GLOVE SURG BIOGEL LATEX SZ 7.5

## (undated) DEVICE — SOL ELECTROLUBE ANTI-STIC

## (undated) DEVICE — SEAL UNIVERSAL 5MM-8MM XI

## (undated) DEVICE — EVACUATOR KIT SMOKE PLUME AWAY

## (undated) DEVICE — DURAPREP SURG SCRUB 26ML

## (undated) DEVICE — KIT ANTIFOG

## (undated) DEVICE — DRAPE LAVH LAPAROSCOPY W/FLUID

## (undated) DEVICE — TUBING HEATED INSUFFLATOR

## (undated) DEVICE — SEE MEDLINE ITEM 157117

## (undated) DEVICE — SEE MEDLINE ITEM 152622

## (undated) DEVICE — SUT MONOCRYL 4-0 PS-1 UND

## (undated) DEVICE — OBTURATOR BLADELESS 8MM XI CLR

## (undated) DEVICE — NDL PNEUMO INSUFFLATI 120MM

## (undated) DEVICE — DRAPE COLUMN DAVINCI XI

## (undated) DEVICE — SUT ABS CLIP LAPRA-TY CTD

## (undated) DEVICE — ADHESIVE MASTISOL VIAL 48/BX

## (undated) DEVICE — PAD PERI POST REPLACEMNT

## (undated) DEVICE — SEE MEDLINE ITEM 157027

## (undated) DEVICE — SYR 3CC LUER LOC

## (undated) DEVICE — IRRIGATOR ENDOSCOPY DISP.

## (undated) DEVICE — ELECTRODE REM PLYHSV RETURN 9

## (undated) DEVICE — SOL NS 1000CC

## (undated) DEVICE — DRAPE STERI LONG

## (undated) DEVICE — LOTION DURA PREP REMOVER 40Z